# Patient Record
Sex: FEMALE | Race: WHITE | NOT HISPANIC OR LATINO | Employment: UNEMPLOYED | URBAN - METROPOLITAN AREA
[De-identification: names, ages, dates, MRNs, and addresses within clinical notes are randomized per-mention and may not be internally consistent; named-entity substitution may affect disease eponyms.]

---

## 2020-02-20 ENCOUNTER — HOSPITAL ENCOUNTER (OUTPATIENT)
Dept: OTHER | Facility: HOSPITAL | Age: 20
Discharge: HOME | End: 2020-02-20
Attending: EMERGENCY MEDICINE

## 2021-09-07 ENCOUNTER — CLINICAL SUPPORT (OUTPATIENT)
Dept: OBSTETRICS AND GYNECOLOGY | Facility: CLINIC | Age: 21
End: 2021-09-07

## 2021-09-07 DIAGNOSIS — Z30.013 ENCOUNTER FOR INITIAL PRESCRIPTION OF INJECTABLE CONTRACEPTIVE: Primary | ICD-10-CM

## 2021-09-07 PROCEDURE — 96372 THER/PROPH/DIAG INJ SC/IM: CPT | Performed by: OBSTETRICS & GYNECOLOGY

## 2021-09-07 RX ORDER — MEDROXYPROGESTERONE ACETATE 150 MG/ML
150 INJECTION, SUSPENSION INTRAMUSCULAR ONCE
Status: COMPLETED | OUTPATIENT
Start: 2021-09-07 | End: 2021-09-07

## 2021-09-07 RX ORDER — MEDROXYPROGESTERONE ACETATE 150 MG/ML
150 INJECTION, SUSPENSION INTRAMUSCULAR
Qty: 0.9 ML | Refills: 0 | OUTPATIENT
Start: 2021-09-07

## 2021-09-07 RX ORDER — MEDROXYPROGESTERONE ACETATE 150 MG/ML
1 INJECTION, SUSPENSION INTRAMUSCULAR
COMMUNITY
End: 2021-09-07

## 2021-09-07 RX ADMIN — MEDROXYPROGESTERONE ACETATE 150 MG: 150 INJECTION, SUSPENSION INTRAMUSCULAR at 15:57

## 2023-05-05 ENCOUNTER — TRANSCRIBE ORDERS (OUTPATIENT)
Dept: ADMINISTRATIVE | Facility: HOSPITAL | Age: 23
End: 2023-05-05
Payer: COMMERCIAL

## 2023-05-05 DIAGNOSIS — R10.2 PELVIC PAIN: Primary | ICD-10-CM

## 2025-01-24 ENCOUNTER — TELEPHONE (OUTPATIENT)
Age: 25
End: 2025-01-24

## 2025-01-24 NOTE — TELEPHONE ENCOUNTER
Caller: Gabi Thao    Relationship to patient: Self    Best call back number: [phone] (home)       Patient is needing: PATIENT HAS AN UPCOMING NEW OB APPOINTMENT WITH [facility]. I WAS UNABLE TO VERIFY HER INSURANCE. SHE STATES IT IS ACTIVE. THE ID # IS [id number]. SUBCRIBER IS BARBARA THAO  75. (PLAN IS THRU [employer])

## 2025-02-04 ENCOUNTER — INITIAL PRENATAL (OUTPATIENT)
Age: 25
End: 2025-02-04
Payer: COMMERCIAL

## 2025-02-04 VITALS — SYSTOLIC BLOOD PRESSURE: 112 MMHG | WEIGHT: 155 LBS | DIASTOLIC BLOOD PRESSURE: 76 MMHG

## 2025-02-04 DIAGNOSIS — O21.9 NAUSEA AND VOMITING DURING PREGNANCY: ICD-10-CM

## 2025-02-04 DIAGNOSIS — Z3A.10 10 WEEKS GESTATION OF PREGNANCY: Primary | ICD-10-CM

## 2025-02-04 DIAGNOSIS — Z34.90 PREGNANCY WITH FETUS OF UNKNOWN GESTATIONAL AGE: ICD-10-CM

## 2025-02-04 PROBLEM — F41.9 ANXIETY DISORDER: Status: ACTIVE | Noted: 2017-08-08

## 2025-02-04 PROBLEM — I73.81 ERYTHROMELALGIA: Status: ACTIVE | Noted: 2022-06-09

## 2025-02-04 PROBLEM — R20.9 SKIN SENSATION DISTURBANCE: Status: ACTIVE | Noted: 2022-06-09

## 2025-02-04 PROBLEM — F90.9 ADHD: Status: ACTIVE | Noted: 2025-02-04

## 2025-02-04 PROBLEM — R20.2 PARESTHESIA: Status: ACTIVE | Noted: 2023-02-24

## 2025-02-04 PROBLEM — F32.A DEPRESSION: Status: ACTIVE | Noted: 2021-06-21

## 2025-02-04 LAB
ABO GROUP BLD: NORMAL
AMPHET+METHAMPHET UR QL: NEGATIVE
AMPHETAMINES UR QL: NEGATIVE
BARBITURATES UR QL SCN: NEGATIVE
BENZODIAZ UR QL SCN: NEGATIVE
BLD GP AB SCN SERPL QL: NEGATIVE
BUPRENORPHINE SERPL-MCNC: NEGATIVE NG/ML
CANNABINOIDS SERPL QL: NEGATIVE
COCAINE UR QL: NEGATIVE
DEPRECATED RDW RBC AUTO: 39.9 FL (ref 37–54)
ERYTHROCYTE [DISTWIDTH] IN BLOOD BY AUTOMATED COUNT: 13 % (ref 12.3–15.4)
FENTANYL UR-MCNC: NEGATIVE NG/ML
HBV SURFACE AG SERPL QL IA: NORMAL
HCT VFR BLD AUTO: 36.4 % (ref 34–46.6)
HCV AB SER QL: NORMAL
HGB BLD-MCNC: 12.6 G/DL (ref 12–15.9)
HIV 1+2 AB+HIV1 P24 AG SERPL QL IA: NORMAL
MCH RBC QN AUTO: 29.5 PG (ref 26.6–33)
MCHC RBC AUTO-ENTMCNC: 34.6 G/DL (ref 31.5–35.7)
MCV RBC AUTO: 85.2 FL (ref 79–97)
METHADONE UR QL SCN: NEGATIVE
OPIATES UR QL: NEGATIVE
OXYCODONE UR QL SCN: NEGATIVE
PCP UR QL SCN: NEGATIVE
PLATELET # BLD AUTO: 290 10*3/MM3 (ref 140–450)
PMV BLD AUTO: 11.8 FL (ref 6–12)
RBC # BLD AUTO: 4.27 10*6/MM3 (ref 3.77–5.28)
RH BLD: POSITIVE
TRICYCLICS UR QL SCN: NEGATIVE
WBC NRBC COR # BLD AUTO: 10.67 10*3/MM3 (ref 3.4–10.8)

## 2025-02-04 PROCEDURE — 86850 RBC ANTIBODY SCREEN: CPT

## 2025-02-04 PROCEDURE — 87340 HEPATITIS B SURFACE AG IA: CPT

## 2025-02-04 PROCEDURE — 86803 HEPATITIS C AB TEST: CPT

## 2025-02-04 PROCEDURE — 86762 RUBELLA ANTIBODY: CPT

## 2025-02-04 PROCEDURE — 86901 BLOOD TYPING SEROLOGIC RH(D): CPT

## 2025-02-04 PROCEDURE — 87491 CHLMYD TRACH DNA AMP PROBE: CPT

## 2025-02-04 PROCEDURE — 86787 VARICELLA-ZOSTER ANTIBODY: CPT

## 2025-02-04 PROCEDURE — 86900 BLOOD TYPING SEROLOGIC ABO: CPT

## 2025-02-04 PROCEDURE — 87591 N.GONORRHOEAE DNA AMP PROB: CPT

## 2025-02-04 PROCEDURE — 87661 TRICHOMONAS VAGINALIS AMPLIF: CPT

## 2025-02-04 PROCEDURE — 85027 COMPLETE CBC AUTOMATED: CPT

## 2025-02-04 PROCEDURE — 86592 SYPHILIS TEST NON-TREP QUAL: CPT

## 2025-02-04 PROCEDURE — 80307 DRUG TEST PRSMV CHEM ANLYZR: CPT

## 2025-02-04 PROCEDURE — G0432 EIA HIV-1/HIV-2 SCREEN: HCPCS

## 2025-02-04 PROCEDURE — 87086 URINE CULTURE/COLONY COUNT: CPT

## 2025-02-04 RX ORDER — DIPHENHYDRAMINE HYDROCHLORIDE 25 MG/1
25 CAPSULE ORAL 3 TIMES DAILY PRN
Qty: 30 TABLET | Refills: 1 | Status: SHIPPED | OUTPATIENT
Start: 2025-02-04

## 2025-02-04 RX ORDER — PROMETHAZINE HYDROCHLORIDE 12.5 MG/1
12.5 TABLET ORAL EVERY 6 HOURS PRN
Qty: 30 TABLET | Refills: 0 | Status: SHIPPED | OUTPATIENT
Start: 2025-02-04

## 2025-02-04 RX ORDER — VITAMIN A, VITAMIN C, VITAMIN D-3, VITAMIN E, VITAMIN B-1, VITAMIN B-2, NIACIN, VITAMIN B-6, CALCIUM, IRON, ZINC, COPPER 4000; 120; 400; 22; 1.84; 3; 20; 10; 1; 12; 200; 27; 25; 2 [IU]/1; MG/1; [IU]/1; MG/1; MG/1; MG/1; MG/1; MG/1; MG/1; UG/1; MG/1; MG/1; MG/1; MG/1
1 TABLET ORAL DAILY
Qty: 30 TABLET | Refills: 11 | Status: SHIPPED | OUTPATIENT
Start: 2025-02-04

## 2025-02-04 RX ORDER — LANOLIN ALCOHOL/MO/W.PET/CERES
50 CREAM (GRAM) TOPICAL DAILY
COMMUNITY
End: 2025-02-04

## 2025-02-04 RX ORDER — PRENATAL VIT/IRON FUM/FOLIC AC 27MG-0.8MG
TABLET ORAL DAILY
COMMUNITY
End: 2025-02-04

## 2025-02-04 NOTE — PROGRESS NOTES
[facility]   Obstetrics and Gynecology   New Obstetric Visit    2025    Patient: Gabi Ralph          MR#:[id number]    History of Present Illness    Chief Complaint   Patient presents with    Initial Prenatal Visit     New OB, LMP 2024, U/S today, Pt C/O nausea and vomiting, currently taking vitamin B6        24 y.o. female  at 10w1d presents for NOB visit.  She is doing well today.  Taking prenatal vitamins.  Has nausea and vomiting  Had two prenatal care visits in [place] at [facility]  Had vb about a month ago was told in the ER she had KARL  Has some difficulty sleeping  Desires NIPT does not want to know gender  Taking vitamin B6  Antione CRESPO is with her  Moved here from [place] recently, living with parents    Studies reviewed:    ________________________________________  Patient Active Problem List   Diagnosis    ADHD    Anxiety disorder    Depression    Erythromelalgia    Paresthesia    Skin sensation disturbance     OB History          1    Para        Term                AB        Living             SAB        IAB        Ectopic        Molar        Multiple        Live Births                      Social History:  Denies h/o gonorrhea, chlamydia, herpes, syphilis, HIV  Denies family history of birth defects and genetic disorders    Past Medical History:   Diagnosis Date    Anxiety     Depression     Migraine aura, persistent      Past Surgical History:   Procedure Laterality Date    EAR TUBES      SKIN SURGERY       Social History     Tobacco Use   Smoking Status Former    Types: Cigarettes   Smokeless Tobacco Not on file     Family History   Problem Relation Age of Onset    Cancer Paternal Grandfather     Hyperlipidemia Other      Prior to Admission medications    Medication Sig Start Date End Date Taking? Authorizing Provider   Fexofenadine HCl (ALLERGY 24-HR PO) Take  by mouth.   Yes Provider, Zhou, MD   Prenatal Vit-Fe  Fumarate-FA (prenatal vitamin 27-0.8) 27-0.8 MG tablet tablet Take  by mouth Daily.  2/4/25 Yes Provider, MD Zhou   vitamin B-6 (PYRIDOXINE) 50 MG tablet Take 1 tablet by mouth Daily.  2/4/25 Yes Provider, MD Zhou   Prenatal Vit-Fe Fumarate-FA (Prenatal Vitamin Plus Low Iron) 27-1 MG tablet Take 1 tablet by mouth Daily. 2/4/25   Kallie Morgan APRN   promethazine (PHENERGAN) 12.5 MG tablet Take 1 tablet by mouth Every 6 (Six) Hours As Needed for Nausea or Vomiting. 2/4/25   Kallie Morgan APRN   vitamin B-6 (PYRIDOXINE) 25 MG tablet Take 1 tablet by mouth 3 (Three) Times a Day As Needed (for nausea). 2/4/25   Kallie Morgan APRN   medroxyPROGESTERone Acetate (Depo-Provera) 150 MG/ML suspension prefilled syringe Inject 1 mL into the appropriate muscle as directed by prescriber Every 3 (Three) Months.  Patient not taking: Reported on 2/4/2025 9/7/21 2/4/25  Ish Turpin Sr., MD     ________________________________________    The following portions of the patient's history were reviewed and updated as appropriate: allergies, current medications, past family history, past medical history, past social history, past surgical history, and problem list.    Review of Systems   Constitutional: Negative.    HENT: Negative.     Eyes: Negative.    Respiratory: Negative.     Cardiovascular: Negative.    Gastrointestinal:  Positive for nausea and vomiting.   Endocrine: Negative.    Genitourinary: Negative.    Musculoskeletal: Negative.    Skin: Negative.    Allergic/Immunologic: Negative.    Neurological: Negative.    Hematological: Negative.    Psychiatric/Behavioral: Negative.              Objective     /76   Wt 70.3 kg (155 lb)    BP Readings from Last 3 Encounters:   02/04/25 112/76      Wt Readings from Last 3 Encounters:   02/04/25 70.3 kg (155 lb)        BMI: There is no height or weight on file to calculate BMI.    Physical Exam  Constitutional:       General: She is not in  acute distress.  Pulmonary:      Effort: Pulmonary effort is normal.   Abdominal:      Palpations: Abdomen is soft.      Tenderness: There is no abdominal tenderness.   Skin:     General: Skin is warm.   Neurological:      Mental Status: She is alert.   Psychiatric:         Mood and Affect: Mood normal.         Thought Content: Thought content normal.         Judgment: Judgment normal.           Assessment:  Diagnoses and all orders for this visit:    1. 10 weeks gestation of pregnancy (Primary)  -     Urine Culture - Urine, Urine, Clean Catch  -     Urine Drug Screen - Urine, Clean Catch  -     promethazine (PHENERGAN) 12.5 MG tablet; Take 1 tablet by mouth Every 6 (Six) Hours As Needed for Nausea or Vomiting.  Dispense: 30 tablet; Refill: 0  -     Prenatal Vit-Fe Fumarate-FA (Prenatal Vitamin Plus Low Iron) 27-1 MG tablet; Take 1 tablet by mouth Daily.  Dispense: 30 tablet; Refill: 11  -     ABO / Rh  -     RPR Qualitative with Reflex to Quant  -     Hepatitis B Surface Antigen  -     Rubella Antibody, IgG  -     Hepatitis C Antibody  -     Antibody Screen  -     Varicella Zoster Antibody, IgG  -     CBC (No Diff)  -     HIV-1 / O / 2 Ag / Antibody  -     Eric Panorama Prenatal Test: Chromosomes 13, 18, 21, X & Y: Triploidy 22Q.11.2 Deletion - Blood, Blood, Venous  -     Eric Horizon 14 (Pan-Ethnic Standard) - Blood, Blood, Venous    2. Pregnancy with fetus of unknown gestational age  -     US Ob Transvaginal; Future  -     Eric Panorama Prenatal Test: Chromosomes 13, 18, 21, X & Y: Triploidy 22Q.11.2 Deletion - Blood, Blood, Venous  -     Eric Horizon 14 (Pan-Ethnic Standard) - Blood, Blood, Venous    3. Nausea and vomiting during pregnancy  -     promethazine (PHENERGAN) 12.5 MG tablet; Take 1 tablet by mouth Every 6 (Six) Hours As Needed for Nausea or Vomiting.  Dispense: 30 tablet; Refill: 0  -     Prenatal Vit-Fe Fumarate-FA (Prenatal Vitamin Plus Low Iron) 27-1 MG tablet; Take 1 tablet by mouth  Daily.  Dispense: 30 tablet; Refill: 11  -     vitamin B-6 (PYRIDOXINE) 25 MG tablet; Take 1 tablet by mouth 3 (Three) Times a Day As Needed (for nausea).  Dispense: 30 tablet; Refill: 1          Plan: Discussed Unisom and B6 she would like a prescription med sent discussed Phenergan can make you drowsy caution before driving  OB labs collected na sent  All questions answered and addressed  Declined pap today   Will request records release for OB in [place]  Early pregnancy counseling provided and New OB folder given  Patient is taking Prenatal vitamins  Problem list reviewed and updated  Reviewed routine prenatal care with the office to include but not limited to expected weight gain during pregnancy, Tylenol products are fine, avoid ibuprofen; not to change cat litter (toxoplasmosis) ; food restrictions including risk of listeria with uncooked deli meat, unwashed fruits and vegetables and unpasteurized cheeses; avoidance of alcohol, tobacco, drugs and saunas/hot tubs. Discussed that the COVID and Flu vaccine is safe and recommended in pregnancy.   SAB warnings reviewed  All questions answered  Discussed typical ultrasound screenings once in the first trimester for dating,  anatomy scan and additional if warranted      Return in about 4 weeks (around 3/4/2025).      Kallie Morgan, JOY  2/4/2025 16:08 EST

## 2025-02-05 LAB — RPR SER QL: NORMAL

## 2025-02-06 LAB
BACTERIA SPEC AEROBE CULT: NO GROWTH
C TRACH RRNA SPEC QL NAA+PROBE: NEGATIVE
N GONORRHOEA RRNA SPEC QL NAA+PROBE: NEGATIVE
RUBV IGG SERPL IA-ACNC: 2.59 INDEX
T VAGINALIS RRNA SPEC QL NAA+PROBE: NEGATIVE
VZV IGG SER QL IA: REACTIVE

## 2025-02-10 LAB
Lab: NORMAL
NTRA FETAL FRACTION: NORMAL
NTRA GENDER OF FETUS: NORMAL
NTRA MONOSOMY X AGE-BASED RISK TEXT: NORMAL
NTRA MONOSOMY X RESULT TEXT: NORMAL
NTRA MONOSOMY X RISK SCORE TEXT: NORMAL
NTRA TRIPLOIDY RESULT TEXT: NORMAL
NTRA TRISOMY 13 AGE-BASED RISK TEXT: NORMAL
NTRA TRISOMY 13 RESULT TEXT: NORMAL
NTRA TRISOMY 13 RISK SCORE TEXT: NORMAL
NTRA TRISOMY 18 AGE-BASED RISK TEXT: NORMAL
NTRA TRISOMY 18 RESULT TEXT: NORMAL
NTRA TRISOMY 18 RISK SCORE TEXT: NORMAL
NTRA TRISOMY 21 AGE-BASED RISK TEXT: NORMAL
NTRA TRISOMY 21 RESULT TEXT: NORMAL
NTRA TRISOMY 21 RISK SCORE TEXT: NORMAL

## 2025-02-12 LAB
Lab: NEGATIVE
Lab: NORMAL
NTRA ALPHA-THALASSEMIA: NEGATIVE
NTRA BETA-HEMOGLOBINOPATHIES: NEGATIVE
NTRA CANAVAN DISEASE: NEGATIVE
NTRA CYSTIC FIBROSIS: NEGATIVE
NTRA DUCHENNE/BECKER MUSCULAR DYSTROPHY: NEGATIVE
NTRA FAMILIAL DYSAUTONOMIA: NEGATIVE
NTRA FRAGILE X SYNDROME: NEGATIVE
NTRA GALACTOSEMIA: NEGATIVE
NTRA GAUCHER DISEASE: NEGATIVE
NTRA MEDIUM CHAIN ACYL-COA DEHYDROGENASE DEFICIENCY: NEGATIVE
NTRA POLYCYSTIC KIDNEY DISEASE, AUTOSOMAL RECESSIVE: NEGATIVE
NTRA SMITH-LEMLI-OPITZ SYNDROME: NEGATIVE
NTRA SPINAL MUSCULAR ATROPHY: NEGATIVE
NTRA TAY-SACHS DISEASE: NEGATIVE

## 2025-02-21 ENCOUNTER — PATIENT ROUNDING (BHMG ONLY) (OUTPATIENT)
Age: 25
End: 2025-02-21
Payer: COMMERCIAL

## 2025-03-03 ENCOUNTER — ROUTINE PRENATAL (OUTPATIENT)
Age: 25
End: 2025-03-03
Payer: COMMERCIAL

## 2025-03-03 VITALS — SYSTOLIC BLOOD PRESSURE: 113 MMHG | WEIGHT: 166 LBS | DIASTOLIC BLOOD PRESSURE: 64 MMHG

## 2025-03-03 DIAGNOSIS — Z3A.14 14 WEEKS GESTATION OF PREGNANCY: Primary | ICD-10-CM

## 2025-03-03 DIAGNOSIS — Z34.92 NORMAL PREGNANCY IN SECOND TRIMESTER: ICD-10-CM

## 2025-03-03 LAB
GLUCOSE UR STRIP-MCNC: NEGATIVE MG/DL
PROT UR STRIP-MCNC: ABNORMAL MG/DL

## 2025-03-03 PROCEDURE — 87624 HPV HI-RISK TYP POOLED RSLT: CPT | Performed by: OBSTETRICS & GYNECOLOGY

## 2025-03-03 PROCEDURE — G0123 SCREEN CERV/VAG THIN LAYER: HCPCS | Performed by: OBSTETRICS & GYNECOLOGY

## 2025-03-04 NOTE — PROGRESS NOTES
Routine Prenatal Visit     Subjective  Gabi Ralph is a 24 y.o.  at 14w1d here for her routine OB visit.   She is taking her prenatal vitamins.Reports no loss of fluid or vaginal bleeding. Patient doing well without any complaints. Pregnancy is complicated by:     Patient Active Problem List   Diagnosis    ADHD    Anxiety disorder    Depression    Erythromelalgia    Paresthesia    Skin sensation disturbance         OB History    Para Term  AB Living   1             SAB IAB Ectopic Molar Multiple Live Births                    # Outcome Date GA Lbr Edgardo/2nd Weight Sex Type Anes PTL Lv   1 Current                    ROS:   General ROS: negative for - chills or fatigue  Genito-Urinary ROS: negative for  change in urinary stream, vaginal discharge     Objective  Physical Exam:   Vitals:    25 1416   BP: 113/64       Uterine Size: size equals dates  FHT: 110-160 BPM    General appearance - alert, well appearing, and in no distress  Abdomen- Soft, Gravid uterus, non-tender to palpation  Extremeties: negative swelling   Pelvic exam:  No external genital lesion  No vaginal lesion  Cervix is visibly closed  Uterus about 14 weeks in size  No adnexal masses    Assessment/Plan:   Assessment & Plan  14 weeks gestation of pregnancy    Orders:    POC Urinalysis Dipstick    IgP, Aptima HPV; Future    IgP, Aptima HPV    Normal pregnancy in second trimester    Orders:    IgP, Aptima HPV      Counseling:     Round Ligament Pain:  The uterus has several ligaments which provide support and keep the uterus in place. As the  uterus grows these ligaments are pulled and stretched which often causes sharp stabbing like pain in the inguinal area.   You may find a pregnancy support band helpful. Changing positions may also help. Yoga is a great way to cope with round ligament and low back pain in pregnancy.    Massage may also help with low back pain   Things to Consider at this Point in your Pregnancy:  Some  women experience swelling in their feet during pregnancy. Compression stockings may help  Drink plenty of water and stay active   Make sure you are eating frequent small meals, nuts are a wonderful snack to keep with you            Return in about 4 weeks (around 3/31/2025) for Routine OB visit.      We have gone over prenatal care to include the timing and content of visits. I informed her how to contact the office and/or on call person in the event of any problems and encouraged her to do so when she feels it is necessary.  We then spent time answering her questions which she indicated were answered to her satisfaction.    Reji Leung MD  3/4/2025 08:39 EST

## 2025-03-10 LAB
CYTOLOGIST CVX/VAG CYTO: ABNORMAL
CYTOLOGY CVX/VAG DOC CYTO: ABNORMAL
CYTOLOGY CVX/VAG DOC THIN PREP: ABNORMAL
DX ICD CODE: ABNORMAL
DX ICD CODE: ABNORMAL
HPV I/H RISK 4 DNA CVX QL PROBE+SIG AMP: POSITIVE
OTHER STN SPEC: ABNORMAL
PATH REPORT.RELEVANT HX SPEC: ABNORMAL
PATHOLOGIST CVX/VAG CYTO: ABNORMAL
SERVICE CMNT-IMP: ABNORMAL
STAT OF ADQ CVX/VAG CYTO-IMP: ABNORMAL

## 2025-04-02 ENCOUNTER — ROUTINE PRENATAL (OUTPATIENT)
Age: 25
End: 2025-04-02
Payer: COMMERCIAL

## 2025-04-02 VITALS — SYSTOLIC BLOOD PRESSURE: 120 MMHG | WEIGHT: 169.8 LBS | DIASTOLIC BLOOD PRESSURE: 67 MMHG

## 2025-04-02 DIAGNOSIS — Z3A.18 18 WEEKS GESTATION OF PREGNANCY: Primary | ICD-10-CM

## 2025-04-02 DIAGNOSIS — O21.9 NAUSEA AND VOMITING DURING PREGNANCY: ICD-10-CM

## 2025-04-02 DIAGNOSIS — O26.812 PREGNANCY RELATED FATIGUE IN SECOND TRIMESTER: ICD-10-CM

## 2025-04-02 DIAGNOSIS — R87.612 LGSIL ON PAP SMEAR OF CERVIX: ICD-10-CM

## 2025-04-02 LAB
25(OH)D3 SERPL-MCNC: 36.2 NG/ML (ref 30–100)
DEPRECATED RDW RBC AUTO: 40.7 FL (ref 37–54)
ERYTHROCYTE [DISTWIDTH] IN BLOOD BY AUTOMATED COUNT: 13 % (ref 12.3–15.4)
FERRITIN SERPL-MCNC: 17.5 NG/ML (ref 13–150)
GLUCOSE UR STRIP-MCNC: NEGATIVE MG/DL
HCT VFR BLD AUTO: 35.4 % (ref 34–46.6)
HGB BLD-MCNC: 11.7 G/DL (ref 12–15.9)
MCH RBC QN AUTO: 28.5 PG (ref 26.6–33)
MCHC RBC AUTO-ENTMCNC: 33.1 G/DL (ref 31.5–35.7)
MCV RBC AUTO: 86.1 FL (ref 79–97)
PLATELET # BLD AUTO: 232 10*3/MM3 (ref 140–450)
PMV BLD AUTO: 11.7 FL (ref 6–12)
PROT UR STRIP-MCNC: NEGATIVE MG/DL
RBC # BLD AUTO: 4.11 10*6/MM3 (ref 3.77–5.28)
WBC NRBC COR # BLD AUTO: 8.92 10*3/MM3 (ref 3.4–10.8)

## 2025-04-02 PROCEDURE — 82728 ASSAY OF FERRITIN: CPT

## 2025-04-02 PROCEDURE — 85027 COMPLETE CBC AUTOMATED: CPT

## 2025-04-02 PROCEDURE — 82105 ALPHA-FETOPROTEIN SERUM: CPT

## 2025-04-02 PROCEDURE — 82306 VITAMIN D 25 HYDROXY: CPT

## 2025-04-02 RX ORDER — PROMETHAZINE HYDROCHLORIDE 12.5 MG/1
12.5 TABLET ORAL EVERY 6 HOURS PRN
Qty: 30 TABLET | Refills: 0 | Status: SHIPPED | OUTPATIENT
Start: 2025-04-02

## 2025-04-02 NOTE — PROGRESS NOTES
Chief Complaint   Patient presents with    Routine Prenatal Visit     Pt is here today for prenatal follow up. Pt is 18w 2d. Pt is having pain and pressure around the navel. Pt is very tired and would like to have iron and vitamin d checked. Pt would like to know what time to start dicussing birth plan. Pt needs refills, and has not been able to get phenergan. Pt is doing well otherwise.       HPI: 24 y.o.  at 18w2d   Feels exhausted   Sleeping ok   Admits to getting up at night to pee  No vb   Wants her labs checked reports hx of jona  Never picked up nausea meds  Wants to review birth plans    Relevant data reviewed:    Last OB US Data (since 2024)       None          Vitals:    25 1357 25 1441   BP: 132/67 120/67   Weight: 77 kg (169 lb 12.8 oz)      Total weight gain for pregnancy:  Not found.    Cx exam:   / /        Review of systems:   Gen: negative  CV:     negative  GI: negative  :   negative  MS:    negative  Neuro: negative and denies headaches and visual changes   Pul: negative    Physical Exam  Constitutional:       General: She is not in acute distress.  Pulmonary:      Effort: Pulmonary effort is normal.   Abdominal:      Palpations: Abdomen is soft.      Tenderness: There is no abdominal tenderness.   Skin:     General: Skin is warm.   Neurological:      Mental Status: She is alert.   Psychiatric:         Mood and Affect: Mood normal.         Thought Content: Thought content normal.         Judgment: Judgment normal.         A/P  1. Intrauterine pregnancy at 18w2d   2. Pregnancy Risk:  NORMAL       Pregnancy related fatigue in second trimester    Orders:    CBC (No Diff)    Ferritin    Vitamin D,25-Hydroxy    18 weeks gestation of pregnancy    Orders:    Alpha Fetoprotein, Maternal    Nausea and vomiting during pregnancy    Orders:    promethazine (PHENERGAN) 12.5 MG tablet; Take 1 tablet by mouth Every 6 (Six) Hours As Needed for Nausea or Vomiting.    LGSIL on Pap smear of  cervix           Nutrition and weight gain were addressed.  -----------------------  PLAN: sab precautions  Will check some labs  Discussed birth plan  She desires to have epidural and let placenta turn completely white  We discussed delayed cord clamping if stable  Discussed lsil pap with hpv will need colpo   Return in about 2 weeks (around 4/16/2025) for anatomy scan.    Kallie Morgan, APRN  4/2/2025 14:45 EDT

## 2025-04-09 LAB
AFP INTERP SERPL-IMP: NORMAL
AFP INTERP SERPL-IMP: NORMAL
AFP MOM SERPL: 1.12
AFP SERPL-MCNC: 46.7 NG/ML
AGE AT DELIVERY: 26 YR
GA METHOD: NORMAL
GA: 18.3 WEEKS
IDDM PATIENT QL: NORMAL
LABORATORY COMMENT REPORT: NORMAL
MULTIPLE PREGNANCY: NORMAL
NEURAL TUBE DEFECT RISK FETUS: 8371 %
RESULT: NORMAL

## 2025-04-17 ENCOUNTER — ROUTINE PRENATAL (OUTPATIENT)
Age: 25
End: 2025-04-17
Payer: COMMERCIAL

## 2025-04-17 VITALS — DIASTOLIC BLOOD PRESSURE: 64 MMHG | SYSTOLIC BLOOD PRESSURE: 118 MMHG | WEIGHT: 178.2 LBS

## 2025-04-17 DIAGNOSIS — Z3A.20 20 WEEKS GESTATION OF PREGNANCY: Primary | ICD-10-CM

## 2025-04-17 LAB
GLUCOSE UR STRIP-MCNC: NEGATIVE MG/DL
PROT UR STRIP-MCNC: NEGATIVE MG/DL

## 2025-04-17 NOTE — PROGRESS NOTES
Chief Complaint   Patient presents with    Routine Prenatal Visit     OB Check, Pt is 20w3d, Anatomy U/S today, Pt has no complaints today, Doing well        HPI: 24 y.o.  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    Cx exam:   / /        Review of systems:   Gen: negative  CV:     negative  GI: negative  :   good fetal movement noted   MS:    negative  Neuro: negative  Pul: negative    Physical Exam  Constitutional:       General: She is not in acute distress.  Pulmonary:      Effort: Pulmonary effort is normal.   Abdominal:      Palpations: Abdomen is soft.      Tenderness: There is no abdominal tenderness.   Skin:     General: Skin is warm.   Neurological:      Mental Status: She is alert.   Psychiatric:         Mood and Affect: Mood normal.         Thought Content: Thought content normal.         Judgment: Judgment normal.         A/P  1. Intrauterine pregnancy at 20w3d   2. Pregnancy Risk:  NORMAL        20 weeks gestation of pregnancy    Orders:    POC Urinalysis Dipstick      Nutrition and weight gain were addressed.  -----------------------  PLAN: anatomy complete today 91% EFW  Return in 4 weeks   PTB precautions  Return in about 4 weeks (around 5/15/2025).    Kallie Morgan, APRN  2025 14:13 EDT

## 2025-05-15 ENCOUNTER — ROUTINE PRENATAL (OUTPATIENT)
Age: 25
End: 2025-05-15
Payer: COMMERCIAL

## 2025-05-15 VITALS — DIASTOLIC BLOOD PRESSURE: 70 MMHG | SYSTOLIC BLOOD PRESSURE: 116 MMHG | WEIGHT: 187 LBS

## 2025-05-15 DIAGNOSIS — L30.9 ECZEMA, UNSPECIFIED TYPE: ICD-10-CM

## 2025-05-15 DIAGNOSIS — O99.712: ICD-10-CM

## 2025-05-15 DIAGNOSIS — Z3A.24 24 WEEKS GESTATION OF PREGNANCY: Primary | ICD-10-CM

## 2025-05-15 DIAGNOSIS — O21.9 NAUSEA AND VOMITING DURING PREGNANCY: ICD-10-CM

## 2025-05-15 LAB
GLUCOSE UR STRIP-MCNC: NEGATIVE MG/DL
PROT UR STRIP-MCNC: NEGATIVE MG/DL

## 2025-05-15 RX ORDER — DIPHENHYDRAMINE HYDROCHLORIDE 25 MG/1
25 CAPSULE ORAL 3 TIMES DAILY PRN
Qty: 30 TABLET | Refills: 1 | Status: SHIPPED | OUTPATIENT
Start: 2025-05-15

## 2025-05-15 NOTE — PROGRESS NOTES
Chief Complaint   Patient presents with    Routine Prenatal Visit     OB Check, Pt is 24w3d, Pt C/O rash on her left shoulder and her hands        HPI: 24 y.o.  at 24w3d doing well  Good fm  No lof, vb or contractions  Has a rash she thinks is her eczema on her hand and shoulder  Wants to change her pharmacy was not able to pickup B6  She is wondering if she can go to drag racing her father races      Relevant data reviewed:    Last OB US Data (since 10/27/2024)       None          Vitals:    05/15/25 1315   BP: 116/70   Weight: 84.8 kg (187 lb)     Total weight gain for pregnancy:  Not found.    Cx exam:   / /        Review of systems:   Gen: negative  CV:     negative  GI: negative  :   negative and good fetal movement noted   MS:    negative  Neuro: negative and denies headaches and visual changes   Pul: negative    Physical Exam  Constitutional:       General: She is not in acute distress.  Pulmonary:      Effort: Pulmonary effort is normal.   Abdominal:      Palpations: Abdomen is soft.      Tenderness: There is no abdominal tenderness.   Skin:     General: Skin is warm.             Comments: Small area noted to shoulder and right hand consistent with eczema rash dry scaly   Neurological:      Mental Status: She is alert.   Psychiatric:         Mood and Affect: Mood normal.         Thought Content: Thought content normal.         Judgment: Judgment normal.         A/P  1. Intrauterine pregnancy at 24w3d   2. Pregnancy Risk:  NORMAL        24 weeks gestation of pregnancy    Orders:    POC Urinalysis Dipstick    Nausea and vomiting during pregnancy    Orders:    vitamin B-6 (PYRIDOXINE) 25 MG tablet; Take 1 tablet by mouth 3 (Three) Times a Day As Needed (for nausea).    Skin and subcutaneous tissue disease complicating pregnancy in second trimester         Eczema, unspecified type            labor was discussed.  Warnings were provided.  Nutrition and weight gain were  addressed.  -----------------------  PLAN: Rash consistent with eczema  Can use hydrocortisone cream over-the-counter  Report any changes  Her partner let me know that she was flagging the cars I do not recommend this at as it is dangerous in pregnancy  PTB precautions  1 hour glucose next appointment    Return in about 4 weeks (around 6/12/2025) for 1 hour GTT.    Kallie Morgan, APRN  5/15/2025 13:30 EDT

## 2025-06-09 ENCOUNTER — ROUTINE PRENATAL (OUTPATIENT)
Age: 25
End: 2025-06-09
Payer: COMMERCIAL

## 2025-06-09 VITALS
DIASTOLIC BLOOD PRESSURE: 84 MMHG | BODY MASS INDEX: 31.99 KG/M2 | SYSTOLIC BLOOD PRESSURE: 131 MMHG | WEIGHT: 192 LBS | HEIGHT: 65 IN

## 2025-06-09 DIAGNOSIS — Z34.93 NORMAL PREGNANCY, THIRD TRIMESTER: Primary | ICD-10-CM

## 2025-06-09 DIAGNOSIS — Z3A.28 28 WEEKS GESTATION OF PREGNANCY: ICD-10-CM

## 2025-06-09 DIAGNOSIS — Z13.89 SCREENING FOR HEMATURIA OR PROTEINURIA: ICD-10-CM

## 2025-06-09 LAB
DEPRECATED RDW RBC AUTO: 44.4 FL (ref 37–54)
ERYTHROCYTE [DISTWIDTH] IN BLOOD BY AUTOMATED COUNT: 13.4 % (ref 12.3–15.4)
GLUCOSE 1H P GLC SERPL-MCNC: 142 MG/DL (ref 65–139)
GLUCOSE UR STRIP-MCNC: NEGATIVE MG/DL
HCT VFR BLD AUTO: 31.6 % (ref 34–46.6)
HGB BLD-MCNC: 10.1 G/DL (ref 12–15.9)
MCH RBC QN AUTO: 29 PG (ref 26.6–33)
MCHC RBC AUTO-ENTMCNC: 32 G/DL (ref 31.5–35.7)
MCV RBC AUTO: 90.8 FL (ref 79–97)
PLATELET # BLD AUTO: 214 10*3/MM3 (ref 140–450)
PMV BLD AUTO: 12.2 FL (ref 6–12)
PROT UR STRIP-MCNC: NEGATIVE MG/DL
RBC # BLD AUTO: 3.48 10*6/MM3 (ref 3.77–5.28)
WBC NRBC COR # BLD AUTO: 9.59 10*3/MM3 (ref 3.4–10.8)

## 2025-06-09 PROCEDURE — 86592 SYPHILIS TEST NON-TREP QUAL: CPT | Performed by: OBSTETRICS & GYNECOLOGY

## 2025-06-09 PROCEDURE — 0502F SUBSEQUENT PRENATAL CARE: CPT | Performed by: OBSTETRICS & GYNECOLOGY

## 2025-06-09 PROCEDURE — 82950 GLUCOSE TEST: CPT | Performed by: OBSTETRICS & GYNECOLOGY

## 2025-06-09 PROCEDURE — 85027 COMPLETE CBC AUTOMATED: CPT | Performed by: OBSTETRICS & GYNECOLOGY

## 2025-06-09 NOTE — PROGRESS NOTES
Routine Prenatal Visit     Subjective  Gabi Ralph is a 24 y.o.  at 28w0d here for her routine OB visit.   She is taking her prenatal vitamins.Reports no loss of fluid or vaginal bleeding. Patient doing well without any complaints. Pregnancy complicated by:     Patient Active Problem List   Diagnosis    ADHD    Anxiety disorder    Depression    Erythromelalgia    Paresthesia    Skin sensation disturbance         OB History    Para Term  AB Living   1        SAB IAB Ectopic Molar Multiple Live Births              # Outcome Date GA Lbr Edgardo/2nd Weight Sex Type Anes PTL Lv   1 Current                    ROS:   General ROS: negative for - chills or fatigue  Genito-Urinary ROS: negative for  change in urinary stream, vaginal discharge     Objective  Physical Exam:   Vitals:    25 1253   BP: 131/84       Uterine Size: size equals dates  FHT: 110-160 BPM    General appearance - alert, well appearing, and in no distress  Abdomen- Soft, Gravid uterus, non-tender to palpation  Extremeties: negative swelling       Assessment/Plan:   Assessment & Plan  Normal pregnancy, third trimester    Orders:    CBC (No Diff)    Gestational Diabetes Screen 1 Hour    RPR; Future    28 weeks gestation of pregnancy           Counseling:     Round Ligament Pain:  The uterus has several ligaments which provide support and keep the uterus in place. As the  uterus grows these ligaments are pulled and stretched which often causes sharp stabbing like pain in the inguinal area.   You may find a pregnancy support band helpful. Changing positions may also help. Yoga is a great way to cope with round ligament and low back pain in pregnancy.    Massage may also help with low back pain   Things to Consider at this Point in your Pregnancy:  Some women experience swelling in their feet during pregnancy. Compression stockings may help  Drink plenty of water and stay active   Make sure you are eating frequent small meals, nuts  are a wonderful snack to keep with you            No follow-ups on file.      We have gone over prenatal care to include the timing and content of visits. I informed her how to contact the office and/or on call person in the event of any problems and encouraged her to do so when she feels it is necessary.  We then spent time answering her questions which she indicated were answered to her satisfaction.    Reji Leung MD  6/9/2025 13:04 EDT

## 2025-06-10 LAB — RPR SER QL: NORMAL

## 2025-06-12 ENCOUNTER — LAB (OUTPATIENT)
Facility: HOSPITAL | Age: 25
End: 2025-06-12
Payer: COMMERCIAL

## 2025-06-12 DIAGNOSIS — O99.810 ABNORMAL GLUCOSE TOLERANCE TEST (GTT) DURING PREGNANCY, ANTEPARTUM: Primary | ICD-10-CM

## 2025-06-12 DIAGNOSIS — O99.810 ABNORMAL GLUCOSE TOLERANCE TEST (GTT) DURING PREGNANCY, ANTEPARTUM: ICD-10-CM

## 2025-06-12 LAB
GLUCOSE 1H P 100 G GLC PO SERPL-MCNC: 142 MG/DL (ref 74–180)
GLUCOSE 2H P 100 G GLC PO SERPL-MCNC: 98 MG/DL (ref 74–155)
GLUCOSE 3H P 100 G GLC PO SERPL-MCNC: 62 MG/DL (ref 74–140)
GLUCOSE BLDC GLUCOMTR-MCNC: 77 MG/DL (ref 70–99)
GLUCOSE P FAST SERPL-MCNC: 85 MG/DL (ref 74–106)

## 2025-06-12 PROCEDURE — 82951 GLUCOSE TOLERANCE TEST (GTT): CPT

## 2025-06-12 PROCEDURE — 36415 COLL VENOUS BLD VENIPUNCTURE: CPT

## 2025-06-12 PROCEDURE — 82952 GTT-ADDED SAMPLES: CPT

## 2025-06-17 ENCOUNTER — TELEPHONE (OUTPATIENT)
Age: 25
End: 2025-06-17
Payer: COMMERCIAL

## 2025-06-17 NOTE — TELEPHONE ENCOUNTER
Spoke to patient to let her know that we received the orders from [facility] for the Breast pump and Sacroiliac Support. They have been signed and faxed back to [facility]. Patient voiced understanding.

## 2025-06-26 ENCOUNTER — ROUTINE PRENATAL (OUTPATIENT)
Age: 25
End: 2025-06-26
Payer: COMMERCIAL

## 2025-06-26 VITALS — SYSTOLIC BLOOD PRESSURE: 116 MMHG | DIASTOLIC BLOOD PRESSURE: 78 MMHG | WEIGHT: 197.6 LBS | BODY MASS INDEX: 32.88 KG/M2

## 2025-06-26 DIAGNOSIS — Z3A.30 30 WEEKS GESTATION OF PREGNANCY: Primary | ICD-10-CM

## 2025-06-26 DIAGNOSIS — D50.9 IRON DEFICIENCY ANEMIA DURING PREGNANCY: ICD-10-CM

## 2025-06-26 DIAGNOSIS — O99.019 IRON DEFICIENCY ANEMIA DURING PREGNANCY: ICD-10-CM

## 2025-06-26 DIAGNOSIS — R11.2 NAUSEA AND VOMITING, UNSPECIFIED VOMITING TYPE: ICD-10-CM

## 2025-06-26 LAB
GLUCOSE UR STRIP-MCNC: NEGATIVE MG/DL
PROT UR STRIP-MCNC: NEGATIVE MG/DL

## 2025-06-26 RX ORDER — ONDANSETRON 4 MG/1
4 TABLET, ORALLY DISINTEGRATING ORAL EVERY 8 HOURS PRN
Qty: 15 TABLET | Refills: 0 | Status: SHIPPED | OUTPATIENT
Start: 2025-06-26

## 2025-06-26 RX ORDER — FERROUS SULFATE 325(65) MG
325 TABLET ORAL
Qty: 30 TABLET | Refills: 3 | Status: SHIPPED | OUTPATIENT
Start: 2025-06-26

## 2025-06-26 NOTE — PROGRESS NOTES
Chief Complaint   Patient presents with    Routine Prenatal Visit     OB Check, Pt is 30w3d, Pt has no complaints today, Doing well        HPI: 24 y.o.  at 30w3d doing ok  She and the possible fob have broken up  She does not want him to be involved going further  She is safe and living with her parents now  Good FM  No lof, vb or contractions  Has experienced some n/v again    Relevant data reviewed:CBC (No Diff) (2025 13:50)     Last OB US Data (since 2024)       None          Vitals:    25 1315   BP: 116/78   Weight: 89.6 kg (197 lb 9.6 oz)     Total weight gain for pregnancy:  28.4 kg (62 lb 9.6 oz)    Cx exam:   / /   Presentation: Variable    Review of systems:   Gen: negative  CV:     negative  GI: negative  :   negative and good fetal movement noted   MS:    negative  Neuro: negative and denies headaches and visual changes   Pul: negative    Physical Exam  Constitutional:       General: She is not in acute distress.  Pulmonary:      Effort: Pulmonary effort is normal.   Abdominal:      Palpations: Abdomen is soft.      Tenderness: There is no abdominal tenderness.   Skin:     General: Skin is warm.   Neurological:      Mental Status: She is alert.   Psychiatric:         Mood and Affect: Mood normal.         Thought Content: Thought content normal.         Judgment: Judgment normal.         A/P  1. Intrauterine pregnancy at 30w3d   2. Pregnancy Risk:  NORMAL        30 weeks gestation of pregnancy    Orders:    POC Urinalysis Dipstick    Nausea and vomiting, unspecified vomiting type    Orders:    ondansetron ODT (ZOFRAN-ODT) 4 MG disintegrating tablet; Place 1 tablet on the tongue Every 8 (Eight) Hours As Needed for Nausea or Vomiting.    Iron deficiency anemia during pregnancy    Orders:    ferrous sulfate 325 (65 FE) MG tablet; Take 1 tablet by mouth Daily With Breakfast.       labor was discussed.  Warnings were provided.  Nutrition and weight gain were  addressed.  -----------------------  PLAN: start iron pill daily with breakfast  Ptb precautions discussed   Nausea meds sent to another pharmacy  Plan tdap next visit       Return in about 2 weeks (around 7/10/2025).    Kallie Morgan, APRN  6/26/2025 14:54 EDT

## 2025-07-11 ENCOUNTER — ROUTINE PRENATAL (OUTPATIENT)
Age: 25
End: 2025-07-11
Payer: COMMERCIAL

## 2025-07-11 VITALS — BODY MASS INDEX: 33.2 KG/M2 | DIASTOLIC BLOOD PRESSURE: 74 MMHG | SYSTOLIC BLOOD PRESSURE: 114 MMHG | WEIGHT: 199.5 LBS

## 2025-07-11 DIAGNOSIS — Z34.93 NORMAL PREGNANCY, THIRD TRIMESTER: ICD-10-CM

## 2025-07-11 DIAGNOSIS — Z23 NEED FOR TDAP VACCINATION: ICD-10-CM

## 2025-07-11 DIAGNOSIS — Z13.89 SCREENING FOR HEMATURIA OR PROTEINURIA: Primary | ICD-10-CM

## 2025-07-11 DIAGNOSIS — Z3A.32 32 WEEKS GESTATION OF PREGNANCY: ICD-10-CM

## 2025-07-11 LAB
GLUCOSE UR STRIP-MCNC: NEGATIVE MG/DL
PROT UR STRIP-MCNC: NEGATIVE MG/DL

## 2025-07-11 NOTE — PROGRESS NOTES
Routine Prenatal Visit     Subjective  Gabi Ralph is a 24 y.o.  at 32w4d here for her routine OB visit.   She is taking her prenatal vitamins.Reports no loss of fluid or vaginal bleeding. Patient doing well without any complaints except for pregnancy discomfort. Pregnancy complicated by:     Patient Active Problem List   Diagnosis    ADHD    Anxiety disorder    Depression    Erythromelalgia    Paresthesia    Skin sensation disturbance         OB History    Para Term  AB Living   1        SAB IAB Ectopic Molar Multiple Live Births              # Outcome Date GA Lbr Edgardo/2nd Weight Sex Type Anes PTL Lv   1 Current                    ROS:   General ROS: negative for - chills or fatigue  Genito-Urinary ROS: negative for  change in urinary stream, vaginal discharge     Objective  Physical Exam:   Vitals:    25 1353   BP: 114/74       Uterine Size: size greater than dates  FHT: 110-160 BPM    General appearance - alert, well appearing, and in no distress  Abdomen- Soft, Gravid uterus, non-tender to palpation  Extremeties: negative swelling       Assessment/Plan:   Assessment & Plan  Screening for hematuria or proteinuria    Orders:    POC Urinalysis Dipstick    Need for Tdap vaccination    Orders:    Tdap Vaccine => 6yo IM (BOOSTRIX/ADACEL)    32 weeks gestation of pregnancy         Normal pregnancy, third trimester         Macrosomia  Suspected macrosomia.  Will repeat growth scan at next visit.         Counseling:   OB precautions, leaking, VB, fide warner vs PTL/Labor  FKC  HTN precautions reviewed: HA, vision change, RUQ/epigastric pain, edema  Round Ligament Pain:  The uterus has several ligaments which provide support and keep the uterus in place. As the  uterus grows these ligaments are pulled and stretched which often causes sharp stabbing like pain in the inguinal area.   You may find a pregnancy support band helpful. Changing positions may also help. Yoga is a great way to  cope with round ligament and low back pain in pregnancy.    Massage may also help with low back pain   Things to Consider at this Point in your Pregnancy:  Some women experience swelling in their feet during pregnancy. Compression stockings may help  Drink plenty of water and stay active   Make sure you are eating frequent small meals, nuts are a wonderful snack to keep with you            No follow-ups on file.      We have gone over prenatal care to include the timing and content of visits. I informed her how to contact the office and/or on call person in the event of any problems and encouraged her to do so when she feels it is necessary.  We then spent time answering her questions which she indicated were answered to her satisfaction.    Reji Leung MD  7/11/2025 14:06 EDT

## 2025-07-24 ENCOUNTER — ROUTINE PRENATAL (OUTPATIENT)
Age: 25
End: 2025-07-24
Payer: COMMERCIAL

## 2025-07-24 VITALS — WEIGHT: 206.1 LBS | SYSTOLIC BLOOD PRESSURE: 118 MMHG | DIASTOLIC BLOOD PRESSURE: 74 MMHG | BODY MASS INDEX: 34.3 KG/M2

## 2025-07-24 DIAGNOSIS — Z36.85 ANTENATAL SCREENING FOR STREPTOCOCCUS B: ICD-10-CM

## 2025-07-24 DIAGNOSIS — O26.893 PELVIC PRESSURE IN PREGNANCY, THIRD TRIMESTER: ICD-10-CM

## 2025-07-24 DIAGNOSIS — R10.2 PELVIC PRESSURE IN PREGNANCY, THIRD TRIMESTER: ICD-10-CM

## 2025-07-24 DIAGNOSIS — Z3A.34 34 WEEKS GESTATION OF PREGNANCY: Primary | ICD-10-CM

## 2025-07-24 DIAGNOSIS — O26.849 UTERINE SIZE DATE DISCREPANCY PREGNANCY: ICD-10-CM

## 2025-07-24 DIAGNOSIS — O36.63X0 EXCESSIVE FETAL GROWTH AFFECTING MANAGEMENT OF PREGNANCY IN THIRD TRIMESTER, SINGLE OR UNSPECIFIED FETUS: ICD-10-CM

## 2025-07-24 LAB
GLUCOSE UR STRIP-MCNC: NEGATIVE MG/DL
PROT UR STRIP-MCNC: NEGATIVE MG/DL

## 2025-07-24 PROCEDURE — 87081 CULTURE SCREEN ONLY: CPT

## 2025-07-24 NOTE — PROGRESS NOTES
Chief Complaint   Patient presents with    Routine Prenatal Visit     OB Check, Pt is 34w3d, U/S today, Pt reports stomach hardening and pelvic pressure, Pt wanting to discuss mucus plug        HPI: Gabi Ralph, a 24 y.o.  at 34w3d, presents for OB follow-up.  She is doing well today.  Denies loss of fluid, vaginal bleeding, and contractions.  Active fetal movement   Felt like she may have lost some of her mucus plug   Had a thick snot like discharge that was one time nothing further  Has fide warner contractions and pelvic pressure  [place]    Relevant data reviewed:    Objective  BP Readings from Last 3 Encounters:   25 118/74   25 114/74   25 116/78      Wt Readings from Last 3 Encounters:   25 93.5 kg (206 lb 1.6 oz)   25 90.5 kg (199 lb 8 oz)   25 89.6 kg (197 lb 9.6 oz)      Total weight gain this pregnancy: 32.3 kg (71 lb 1.6 oz)    General: Awake, alert, no apparent distress  Respiratory: No increased work of breathing  Abdomen: Fundus soft and nontender  Extremity: Nontender, no edema      Last OB US Data (since 2025)         Value Time User    Placenta location  Posterior 2025  1:32 PM Kallie Morgan APRN    EFW%  96%ile 2025  1:32 PM Kallie Morgan APRN    AC%  99%ile 2025  1:32 PM Kallie Morgan APRN    BPP  8/8 2025  1:32 PM Kallie Morgan APRN    WIL  18.57 cm 2025  1:32 PM Kallie Morgan APRN          Vitals:    25 1300   BP: 118/74   Weight: 93.5 kg (206 lb 1.6 oz)     Total weight gain for pregnancy:  32.3 kg (71 lb 1.6 oz)    Review of systems:   Gen: negative  CV:     negative  GI: negative  :   good fetal movement noted , fide warner type contractions, and pelvic pressure  MS:    negative  Neuro: negative and denies headaches and visual changes   Pul: negative    Physical Exam  Constitutional:       General: She is not in acute distress.  Pulmonary:       Effort: Pulmonary effort is normal.   Abdominal:      Palpations: Abdomen is soft.      Tenderness: There is no abdominal tenderness.   Skin:     General: Skin is warm.   Neurological:      Mental Status: She is alert.   Psychiatric:         Mood and Affect: Mood normal.         Thought Content: Thought content normal.         Judgment: Judgment normal.         A/P  1. Intrauterine pregnancy at 34w3d       Diagnoses and all orders for this visit:    1. 34 weeks gestation of pregnancy (Primary)    2. Uterine size date discrepancy pregnancy    3. Pelvic pressure in pregnancy, third trimester    4.  screening for streptococcus B  -     Group B Streptococcus Culture - Swab, Vaginal/Rectum    5. Excessive fetal growth affecting management of pregnancy in third trimester, single or unspecified fetus        Pre-eclampsia symptoms were discussed and warnings were given.   labor was discussed.  Warnings were provided.  Nutrition and weight gain were addressed.  -----------------------  PLAN: discussed LGA today  We reviewed potential risk with large babies and delivery modes  Discussed possibility of  pending size of baby   Will discuss with Dr. Leung  Discussed I do not recommend her traveling far distances from a hospital that would be able to provider her care in the event she needs it     Return in about 1 week (around 2025).    Kallie Morgan, APRN  2025 13:40 EDT

## 2025-07-26 LAB — BACTERIA SPEC AEROBE CULT: NORMAL

## 2025-07-28 ENCOUNTER — TELEPHONE (OUTPATIENT)
Age: 25
End: 2025-07-28

## 2025-08-05 ENCOUNTER — HOSPITAL ENCOUNTER (OUTPATIENT)
Facility: HOSPITAL | Age: 25
Discharge: HOME | End: 2025-08-05
Attending: OBSTETRICS & GYNECOLOGY | Admitting: OBSTETRICS & GYNECOLOGY
Payer: COMMERCIAL

## 2025-08-05 VITALS
DIASTOLIC BLOOD PRESSURE: 81 MMHG | SYSTOLIC BLOOD PRESSURE: 125 MMHG | WEIGHT: 206 LBS | HEART RATE: 97 BPM | BODY MASS INDEX: 34.32 KG/M2 | TEMPERATURE: 98.1 F | RESPIRATION RATE: 22 BRPM | HEIGHT: 65 IN | OXYGEN SATURATION: 98 %

## 2025-08-05 LAB
BILIRUB BLD-MCNC: NEGATIVE MG/DL
CLARITY, POC: CLEAR
COLOR UR: YELLOW
GLUCOSE UR STRIP-MCNC: ABNORMAL MG/DL
KETONES UR QL: NEGATIVE
LEUKOCYTE EST, POC: ABNORMAL
NITRITE UR-MCNC: NEGATIVE MG/ML
PH UR: 7 [PH] (ref 5–8)
PROT UR STRIP-MCNC: NEGATIVE MG/DL
RBC # UR STRIP: NEGATIVE /UL
SP GR UR: 1.01 (ref 1–1.03)
UROBILINOGEN UR QL: ABNORMAL

## 2025-08-05 PROCEDURE — G0463 HOSPITAL OUTPT CLINIC VISIT: HCPCS

## 2025-08-05 PROCEDURE — 81002 URINALYSIS NONAUTO W/O SCOPE: CPT | Performed by: OBSTETRICS & GYNECOLOGY

## 2025-08-05 PROCEDURE — 59025 FETAL NON-STRESS TEST: CPT

## 2025-08-05 RX ORDER — DOCUSATE SODIUM 100 MG/1
100 CAPSULE, LIQUID FILLED ORAL 2 TIMES DAILY
COMMUNITY

## 2025-08-06 ENCOUNTER — ROUTINE PRENATAL (OUTPATIENT)
Age: 25
End: 2025-08-06
Payer: COMMERCIAL

## 2025-08-06 VITALS — DIASTOLIC BLOOD PRESSURE: 88 MMHG | WEIGHT: 207 LBS | SYSTOLIC BLOOD PRESSURE: 127 MMHG | BODY MASS INDEX: 34.45 KG/M2

## 2025-08-06 DIAGNOSIS — Z3A.36 36 WEEKS GESTATION OF PREGNANCY: Primary | ICD-10-CM

## 2025-08-06 DIAGNOSIS — Z34.93 NORMAL PREGNANCY, THIRD TRIMESTER: ICD-10-CM

## 2025-08-06 LAB
GLUCOSE UR STRIP-MCNC: NEGATIVE MG/DL
PROT UR STRIP-MCNC: ABNORMAL MG/DL

## 2025-08-07 DIAGNOSIS — O21.9 NAUSEA AND VOMITING DURING PREGNANCY: ICD-10-CM

## 2025-08-07 DIAGNOSIS — R11.2 NAUSEA AND VOMITING, UNSPECIFIED VOMITING TYPE: ICD-10-CM

## 2025-08-07 RX ORDER — PROMETHAZINE HYDROCHLORIDE 12.5 MG/1
12.5 TABLET ORAL EVERY 6 HOURS PRN
Qty: 30 TABLET | Refills: 0 | Status: CANCELLED | OUTPATIENT
Start: 2025-08-07

## 2025-08-08 ENCOUNTER — ANESTHESIA EVENT (OUTPATIENT)
Dept: LABOR AND DELIVERY | Facility: HOSPITAL | Age: 25
End: 2025-08-08
Payer: COMMERCIAL

## 2025-08-08 ENCOUNTER — TELEPHONE (OUTPATIENT)
Age: 25
End: 2025-08-08
Payer: COMMERCIAL

## 2025-08-08 ENCOUNTER — HOSPITAL ENCOUNTER (INPATIENT)
Facility: HOSPITAL | Age: 25
LOS: 3 days | Discharge: HOME | End: 2025-08-11
Attending: OBSTETRICS & GYNECOLOGY | Admitting: OBSTETRICS & GYNECOLOGY
Payer: COMMERCIAL

## 2025-08-08 ENCOUNTER — ANESTHESIA (OUTPATIENT)
Dept: LABOR AND DELIVERY | Facility: HOSPITAL | Age: 25
End: 2025-08-08
Payer: COMMERCIAL

## 2025-08-08 PROBLEM — Z34.90 ENCOUNTER FOR INDUCTION OF LABOR: Status: ACTIVE | Noted: 2025-08-08

## 2025-08-08 LAB
ABO GROUP BLD: NORMAL
ALBUMIN SERPL-MCNC: 3.5 G/DL (ref 3.5–5.2)
ALBUMIN/GLOB SERPL: 1.3 G/DL
ALP SERPL-CCNC: 103 U/L (ref 39–117)
ALT SERPL W P-5'-P-CCNC: 11 U/L (ref 1–33)
AMPHET+METHAMPHET UR QL: NEGATIVE
AMPHETAMINES UR QL: NEGATIVE
ANION GAP SERPL CALCULATED.3IONS-SCNC: 13.3 MMOL/L (ref 5–15)
AST SERPL-CCNC: 16 U/L (ref 1–32)
BARBITURATES UR QL SCN: NEGATIVE
BENZODIAZ UR QL SCN: NEGATIVE
BILIRUB SERPL-MCNC: 0.3 MG/DL (ref 0–1.2)
BLD GP AB SCN SERPL QL: NEGATIVE
BUN SERPL-MCNC: 4.6 MG/DL (ref 6–20)
BUN/CREAT SERPL: 7.1 (ref 7–25)
BUPRENORPHINE SERPL-MCNC: NEGATIVE NG/ML
CALCIUM SPEC-SCNC: 11.2 MG/DL (ref 8.6–10.5)
CANNABINOIDS SERPL QL: NEGATIVE
CHLORIDE SERPL-SCNC: 105 MMOL/L (ref 98–107)
CO2 SERPL-SCNC: 17.7 MMOL/L (ref 22–29)
COCAINE UR QL: NEGATIVE
CREAT SERPL-MCNC: 0.65 MG/DL (ref 0.57–1)
CREAT UR-MCNC: 34.3 MG/DL
DEPRECATED RDW RBC AUTO: 40.6 FL (ref 37–54)
EGFRCR SERPLBLD CKD-EPI 2021: 125.5 ML/MIN/1.73
ERYTHROCYTE [DISTWIDTH] IN BLOOD BY AUTOMATED COUNT: 13.3 % (ref 12.3–15.4)
FENTANYL UR-MCNC: NEGATIVE NG/ML
GLOBULIN UR ELPH-MCNC: 2.7 GM/DL
GLUCOSE SERPL-MCNC: 97 MG/DL (ref 65–99)
HCT VFR BLD AUTO: 29.5 % (ref 34–46.6)
HGB BLD-MCNC: 10.1 G/DL (ref 12–15.9)
MCH RBC QN AUTO: 28.9 PG (ref 26.6–33)
MCHC RBC AUTO-ENTMCNC: 34.2 G/DL (ref 31.5–35.7)
MCV RBC AUTO: 84.5 FL (ref 79–97)
METHADONE UR QL SCN: NEGATIVE
OPIATES UR QL: NEGATIVE
OXYCODONE UR QL SCN: NEGATIVE
PCP UR QL SCN: NEGATIVE
PLATELET # BLD AUTO: 201 10*3/MM3 (ref 140–450)
PMV BLD AUTO: 11.5 FL (ref 6–12)
POTASSIUM SERPL-SCNC: 3.4 MMOL/L (ref 3.5–5.2)
PROT ?TM UR-MCNC: 14.1 MG/DL
PROT SERPL-MCNC: 6.2 G/DL (ref 6–8.5)
PROT/CREAT UR: 0.41 MG/G{CREAT}
RBC # BLD AUTO: 3.49 10*6/MM3 (ref 3.77–5.28)
RH BLD: POSITIVE
SODIUM SERPL-SCNC: 136 MMOL/L (ref 136–145)
T&S EXPIRATION DATE: NORMAL
TREPONEMA PALLIDUM IGG+IGM AB [PRESENCE] IN SERUM OR PLASMA BY IMMUNOASSAY: NORMAL
TRICYCLICS UR QL SCN: NEGATIVE
WBC NRBC COR # BLD AUTO: 10.67 10*3/MM3 (ref 3.4–10.8)

## 2025-08-08 PROCEDURE — 86900 BLOOD TYPING SEROLOGIC ABO: CPT | Performed by: OBSTETRICS & GYNECOLOGY

## 2025-08-08 PROCEDURE — 25010000002 ONDANSETRON PER 1 MG: Performed by: OBSTETRICS & GYNECOLOGY

## 2025-08-08 PROCEDURE — C1755 CATHETER, INTRASPINAL: HCPCS | Performed by: NURSE ANESTHETIST, CERTIFIED REGISTERED

## 2025-08-08 PROCEDURE — 25010000002 BETAMETHASONE ACET & SOD PHOS PER 4 MG: Performed by: OBSTETRICS & GYNECOLOGY

## 2025-08-08 PROCEDURE — 82570 ASSAY OF URINE CREATININE: CPT | Performed by: OBSTETRICS & GYNECOLOGY

## 2025-08-08 PROCEDURE — 25010000002 OXYTOCIN PER 10 UNITS: Performed by: OBSTETRICS & GYNECOLOGY

## 2025-08-08 PROCEDURE — 86850 RBC ANTIBODY SCREEN: CPT | Performed by: OBSTETRICS & GYNECOLOGY

## 2025-08-08 PROCEDURE — 84156 ASSAY OF PROTEIN URINE: CPT | Performed by: OBSTETRICS & GYNECOLOGY

## 2025-08-08 PROCEDURE — 86780 TREPONEMA PALLIDUM: CPT | Performed by: OBSTETRICS & GYNECOLOGY

## 2025-08-08 PROCEDURE — 25810000003 SODIUM CHLORIDE 0.9 % SOLUTION: Performed by: OBSTETRICS & GYNECOLOGY

## 2025-08-08 PROCEDURE — 25810000003 LACTATED RINGERS PER 1000 ML: Performed by: OBSTETRICS & GYNECOLOGY

## 2025-08-08 PROCEDURE — 80307 DRUG TEST PRSMV CHEM ANLYZR: CPT | Performed by: OBSTETRICS & GYNECOLOGY

## 2025-08-08 PROCEDURE — 3E033VJ INTRODUCTION OF OTHER HORMONE INTO PERIPHERAL VEIN, PERCUTANEOUS APPROACH: ICD-10-PCS | Performed by: OBSTETRICS & GYNECOLOGY

## 2025-08-08 PROCEDURE — 25010000002 ROPIVACAINE PER 1 MG: Performed by: NURSE ANESTHETIST, CERTIFIED REGISTERED

## 2025-08-08 PROCEDURE — 0U7C7DJ DILATION OF CERVIX WITH INTRALUM DEV, TEMP, VIA OPENING: ICD-10-PCS | Performed by: OBSTETRICS & GYNECOLOGY

## 2025-08-08 PROCEDURE — 86901 BLOOD TYPING SEROLOGIC RH(D): CPT | Performed by: OBSTETRICS & GYNECOLOGY

## 2025-08-08 PROCEDURE — S0260 H&P FOR SURGERY: HCPCS | Performed by: OBSTETRICS & GYNECOLOGY

## 2025-08-08 PROCEDURE — 85027 COMPLETE CBC AUTOMATED: CPT | Performed by: OBSTETRICS & GYNECOLOGY

## 2025-08-08 PROCEDURE — 80053 COMPREHEN METABOLIC PANEL: CPT | Performed by: OBSTETRICS & GYNECOLOGY

## 2025-08-08 RX ORDER — ROPIVACAINE HYDROCHLORIDE 2 MG/ML
INJECTION, SOLUTION EPIDURAL; INFILTRATION; PERINEURAL
Status: COMPLETED | OUTPATIENT
Start: 2025-08-08 | End: 2025-08-09

## 2025-08-08 RX ORDER — OXYTOCIN/0.9 % SODIUM CHLORIDE 30/500 ML
1 PLASTIC BAG, INJECTION (ML) INTRAVENOUS
Status: DISCONTINUED | OUTPATIENT
Start: 2025-08-08 | End: 2025-08-09

## 2025-08-08 RX ORDER — MAGNESIUM CARB/ALUMINUM HYDROX 105-160MG
30 TABLET,CHEWABLE ORAL ONCE
Status: DISCONTINUED | OUTPATIENT
Start: 2025-08-08 | End: 2025-08-09 | Stop reason: HOSPADM

## 2025-08-08 RX ORDER — ONDANSETRON 2 MG/ML
4 INJECTION INTRAMUSCULAR; INTRAVENOUS EVERY 6 HOURS PRN
Status: DISCONTINUED | OUTPATIENT
Start: 2025-08-08 | End: 2025-08-09

## 2025-08-08 RX ORDER — ONDANSETRON 4 MG/1
4 TABLET, ORALLY DISINTEGRATING ORAL EVERY 8 HOURS PRN
Qty: 15 TABLET | Refills: 0 | Status: SHIPPED | OUTPATIENT
Start: 2025-08-08

## 2025-08-08 RX ORDER — LIDOCAINE HYDROCHLORIDE 10 MG/ML
0.5 INJECTION, SOLUTION EPIDURAL; INFILTRATION; INTRACAUDAL; PERINEURAL ONCE AS NEEDED
Status: DISCONTINUED | OUTPATIENT
Start: 2025-08-08 | End: 2025-08-09 | Stop reason: HOSPADM

## 2025-08-08 RX ORDER — ONDANSETRON 4 MG/1
4 TABLET, ORALLY DISINTEGRATING ORAL EVERY 6 HOURS PRN
Status: DISCONTINUED | OUTPATIENT
Start: 2025-08-08 | End: 2025-08-09

## 2025-08-08 RX ORDER — SODIUM CHLORIDE 0.9 % (FLUSH) 0.9 %
10 SYRINGE (ML) INJECTION EVERY 12 HOURS SCHEDULED
Status: DISCONTINUED | OUTPATIENT
Start: 2025-08-08 | End: 2025-08-09 | Stop reason: HOSPADM

## 2025-08-08 RX ORDER — LIDOCAINE HYDROCHLORIDE AND EPINEPHRINE 15; 5 MG/ML; UG/ML
INJECTION, SOLUTION EPIDURAL
Status: COMPLETED | OUTPATIENT
Start: 2025-08-08 | End: 2025-08-08

## 2025-08-08 RX ORDER — BETAMETHASONE SODIUM PHOSPHATE AND BETAMETHASONE ACETATE 3; 3 MG/ML; MG/ML
12 INJECTION, SUSPENSION INTRA-ARTICULAR; INTRALESIONAL; INTRAMUSCULAR; SOFT TISSUE EVERY 24 HOURS
Status: DISCONTINUED | OUTPATIENT
Start: 2025-08-08 | End: 2025-08-09

## 2025-08-08 RX ORDER — SODIUM CHLORIDE, SODIUM LACTATE, POTASSIUM CHLORIDE, CALCIUM CHLORIDE 600; 310; 30; 20 MG/100ML; MG/100ML; MG/100ML; MG/100ML
125 INJECTION, SOLUTION INTRAVENOUS CONTINUOUS
Status: DISCONTINUED | OUTPATIENT
Start: 2025-08-08 | End: 2025-08-09

## 2025-08-08 RX ORDER — SODIUM CHLORIDE 9 MG/ML
40 INJECTION, SOLUTION INTRAVENOUS AS NEEDED
Status: DISCONTINUED | OUTPATIENT
Start: 2025-08-08 | End: 2025-08-09 | Stop reason: HOSPADM

## 2025-08-08 RX ORDER — ACETAMINOPHEN 325 MG/1
650 TABLET ORAL EVERY 4 HOURS PRN
Status: DISCONTINUED | OUTPATIENT
Start: 2025-08-08 | End: 2025-08-09 | Stop reason: HOSPADM

## 2025-08-08 RX ORDER — SODIUM CHLORIDE 0.9 % (FLUSH) 0.9 %
10 SYRINGE (ML) INJECTION AS NEEDED
Status: DISCONTINUED | OUTPATIENT
Start: 2025-08-08 | End: 2025-08-09 | Stop reason: HOSPADM

## 2025-08-08 RX ORDER — EPHEDRINE SULFATE 50 MG/ML
5 INJECTION, SOLUTION INTRAVENOUS
Status: DISCONTINUED | OUTPATIENT
Start: 2025-08-08 | End: 2025-08-09 | Stop reason: HOSPADM

## 2025-08-08 RX ORDER — TERBUTALINE SULFATE 1 MG/ML
0.25 INJECTION SUBCUTANEOUS AS NEEDED
Status: DISCONTINUED | OUTPATIENT
Start: 2025-08-08 | End: 2025-08-09 | Stop reason: HOSPADM

## 2025-08-08 RX ORDER — FENTANYL/ROPIVACAINE/NS/PF 2MCG/ML-.2
PLASTIC BAG, INJECTION (ML) INJECTION
Status: COMPLETED
Start: 2025-08-08 | End: 2025-08-08

## 2025-08-08 RX ORDER — BUTORPHANOL TARTRATE 2 MG/ML
1 INJECTION, SOLUTION INTRAMUSCULAR; INTRAVENOUS
Status: DISCONTINUED | OUTPATIENT
Start: 2025-08-08 | End: 2025-08-09 | Stop reason: HOSPADM

## 2025-08-08 RX ADMIN — BETAMETHASONE SODIUM PHOSPHATE AND BETAMETHASONE ACETATE 12 MG: 3; 3 INJECTION, SUSPENSION INTRA-ARTICULAR; INTRALESIONAL; INTRAMUSCULAR at 12:25

## 2025-08-08 RX ADMIN — Medication 10 ML/HR: at 17:29

## 2025-08-08 RX ADMIN — SODIUM CHLORIDE, POTASSIUM CHLORIDE, SODIUM LACTATE AND CALCIUM CHLORIDE 125 ML/HR: 600; 310; 30; 20 INJECTION, SOLUTION INTRAVENOUS at 16:00

## 2025-08-08 RX ADMIN — SODIUM CHLORIDE, POTASSIUM CHLORIDE, SODIUM LACTATE AND CALCIUM CHLORIDE 125 ML/HR: 600; 310; 30; 20 INJECTION, SOLUTION INTRAVENOUS at 20:24

## 2025-08-08 RX ADMIN — LIDOCAINE HYDROCHLORIDE AND EPINEPHRINE 3 ML: 15; 5 INJECTION, SOLUTION EPIDURAL at 17:29

## 2025-08-08 RX ADMIN — LIDOCAINE HYDROCHLORIDE AND EPINEPHRINE 2 ML: 15; 5 INJECTION, SOLUTION EPIDURAL at 17:22

## 2025-08-08 RX ADMIN — SODIUM CHLORIDE, POTASSIUM CHLORIDE, SODIUM LACTATE AND CALCIUM CHLORIDE 125 ML/HR: 600; 310; 30; 20 INJECTION, SOLUTION INTRAVENOUS at 14:10

## 2025-08-08 RX ADMIN — ONDANSETRON 4 MG: 2 INJECTION INTRAMUSCULAR; INTRAVENOUS at 15:30

## 2025-08-08 RX ADMIN — ROPIVACAINE HYDROCHLORIDE 5 ML: 2 INJECTION, SOLUTION EPIDURAL; INFILTRATION; PERINEURAL at 17:29

## 2025-08-08 RX ADMIN — SODIUM CHLORIDE 2 MILLI-UNITS/MIN: 9 INJECTION, SOLUTION INTRAVENOUS at 14:43

## 2025-08-09 LAB
ATMOSPHERIC PRESS: 744.4 MMHG
ATMOSPHERIC PRESS: 745.6 MMHG
BASE EXCESS BLDCOA CALC-SCNC: -4.2 MMOL/L (ref -2–2)
BASE EXCESS BLDCOV CALC-SCNC: -3.3 MMOL/L (ref -2–2)
HCO3 BLDCOA-SCNC: 24.9 MMOL/L
HCO3 BLDCOV-SCNC: 22.5 MMOL/L
PCO2 BLDCOA: 61.4 MMHG (ref 33–49)
PCO2 BLDCOV: 42.1 MM HG (ref 35–51.3)
PH BLDCOA: 7.22 PH UNITS (ref 7.18–7.34)
PH BLDCOV: 7.34 PH UNITS (ref 7.26–7.4)
PO2 BLDCOA: 14.5 MMHG
PO2 BLDCOV: 23 MM HG (ref 19–39)
SAO2 % BLDCOA: 12.8 %
SAO2 % BLDCOV: 36.2 %

## 2025-08-09 PROCEDURE — 59025 FETAL NON-STRESS TEST: CPT

## 2025-08-09 PROCEDURE — 3E0P7VZ INTRODUCTION OF HORMONE INTO FEMALE REPRODUCTIVE, VIA NATURAL OR ARTIFICIAL OPENING: ICD-10-PCS | Performed by: OBSTETRICS & GYNECOLOGY

## 2025-08-09 PROCEDURE — 25010000002 FENTANYL CITRATE (PF) 50 MCG/ML SOLUTION: Performed by: NURSE ANESTHETIST, CERTIFIED REGISTERED

## 2025-08-09 PROCEDURE — 25810000003 LACTATED RINGERS PER 1000 ML: Performed by: OBSTETRICS & GYNECOLOGY

## 2025-08-09 PROCEDURE — 25010000002 PROCHLORPERAZINE 10 MG/2ML SOLUTION: Performed by: NURSE ANESTHETIST, CERTIFIED REGISTERED

## 2025-08-09 PROCEDURE — 25010000002 OXYTOCIN PER 10 UNITS: Performed by: OBSTETRICS & GYNECOLOGY

## 2025-08-09 PROCEDURE — 25010000002 ONDANSETRON PER 1 MG: Performed by: OBSTETRICS & GYNECOLOGY

## 2025-08-09 PROCEDURE — 51702 INSERT TEMP BLADDER CATH: CPT

## 2025-08-09 PROCEDURE — 25010000002 METHYLERGONOVINE MALEATE PER 0.2 MG

## 2025-08-09 PROCEDURE — 25010000002 LIDOCAINE-EPINEPHRINE (PF) 2 %-1:200000 SOLUTION: Performed by: NURSE ANESTHETIST, CERTIFIED REGISTERED

## 2025-08-09 PROCEDURE — 82803 BLOOD GASES ANY COMBINATION: CPT | Performed by: OBSTETRICS & GYNECOLOGY

## 2025-08-09 PROCEDURE — 59400 OBSTETRICAL CARE: CPT | Performed by: OBSTETRICS & GYNECOLOGY

## 2025-08-09 PROCEDURE — 3E033GC INTRODUCTION OF OTHER THERAPEUTIC SUBSTANCE INTO PERIPHERAL VEIN, PERCUTANEOUS APPROACH: ICD-10-PCS | Performed by: OBSTETRICS & GYNECOLOGY

## 2025-08-09 PROCEDURE — 25810000003 SODIUM CHLORIDE 0.9 % SOLUTION: Performed by: OBSTETRICS & GYNECOLOGY

## 2025-08-09 PROCEDURE — 25010000002 ROPIVACAINE PER 1 MG: Performed by: NURSE ANESTHETIST, CERTIFIED REGISTERED

## 2025-08-09 RX ORDER — DOCUSATE SODIUM 100 MG/1
100 CAPSULE, LIQUID FILLED ORAL 2 TIMES DAILY
Status: DISCONTINUED | OUTPATIENT
Start: 2025-08-09 | End: 2025-08-11 | Stop reason: HOSPADM

## 2025-08-09 RX ORDER — OXYTOCIN/0.9 % SODIUM CHLORIDE 30/500 ML
250 PLASTIC BAG, INJECTION (ML) INTRAVENOUS CONTINUOUS
Status: ACTIVE | OUTPATIENT
Start: 2025-08-09 | End: 2025-08-09

## 2025-08-09 RX ORDER — HYDROCODONE BITARTRATE AND ACETAMINOPHEN 5; 325 MG/1; MG/1
1 TABLET ORAL EVERY 4 HOURS PRN
Status: DISCONTINUED | OUTPATIENT
Start: 2025-08-09 | End: 2025-08-11 | Stop reason: HOSPADM

## 2025-08-09 RX ORDER — FENTANYL CITRATE 50 UG/ML
INJECTION, SOLUTION INTRAMUSCULAR; INTRAVENOUS
Status: COMPLETED
Start: 2025-08-09 | End: 2025-08-09

## 2025-08-09 RX ORDER — ACETAMINOPHEN 325 MG/1
650 TABLET ORAL EVERY 6 HOURS
Status: DISCONTINUED | OUTPATIENT
Start: 2025-08-09 | End: 2025-08-09 | Stop reason: HOSPADM

## 2025-08-09 RX ORDER — CALCIUM CARBONATE 500 MG/1
2 TABLET, CHEWABLE ORAL 3 TIMES DAILY PRN
Status: DISCONTINUED | OUTPATIENT
Start: 2025-08-09 | End: 2025-08-11 | Stop reason: HOSPADM

## 2025-08-09 RX ORDER — TRANEXAMIC ACID 10 MG/ML
INJECTION, SOLUTION INTRAVENOUS
Status: COMPLETED
Start: 2025-08-09 | End: 2025-08-09

## 2025-08-09 RX ORDER — METHYLERGONOVINE MALEATE 0.2 MG/ML
INJECTION INTRAVENOUS
Status: COMPLETED
Start: 2025-08-09 | End: 2025-08-09

## 2025-08-09 RX ORDER — PROCHLORPERAZINE EDISYLATE 5 MG/ML
10 INJECTION INTRAMUSCULAR; INTRAVENOUS EVERY 6 HOURS PRN
Status: DISCONTINUED | OUTPATIENT
Start: 2025-08-09 | End: 2025-08-09

## 2025-08-09 RX ORDER — ONDANSETRON 2 MG/ML
4 INJECTION INTRAMUSCULAR; INTRAVENOUS EVERY 6 HOURS PRN
Status: DISCONTINUED | OUTPATIENT
Start: 2025-08-09 | End: 2025-08-09 | Stop reason: HOSPADM

## 2025-08-09 RX ORDER — HYDROCODONE BITARTRATE AND ACETAMINOPHEN 10; 325 MG/1; MG/1
1 TABLET ORAL EVERY 4 HOURS PRN
Status: DISCONTINUED | OUTPATIENT
Start: 2025-08-09 | End: 2025-08-11 | Stop reason: HOSPADM

## 2025-08-09 RX ORDER — ROPIVACAINE HYDROCHLORIDE 2 MG/ML
INJECTION, SOLUTION EPIDURAL; INFILTRATION; PERINEURAL
Status: COMPLETED
Start: 2025-08-09 | End: 2025-08-09

## 2025-08-09 RX ORDER — OXYTOCIN/0.9 % SODIUM CHLORIDE 30/500 ML
125 PLASTIC BAG, INJECTION (ML) INTRAVENOUS ONCE AS NEEDED
Status: DISCONTINUED | OUTPATIENT
Start: 2025-08-09 | End: 2025-08-11 | Stop reason: HOSPADM

## 2025-08-09 RX ORDER — ONDANSETRON 4 MG/1
4 TABLET, ORALLY DISINTEGRATING ORAL EVERY 6 HOURS PRN
Status: DISCONTINUED | OUTPATIENT
Start: 2025-08-09 | End: 2025-08-11 | Stop reason: HOSPADM

## 2025-08-09 RX ORDER — MISOPROSTOL 200 UG/1
TABLET ORAL
Status: COMPLETED
Start: 2025-08-09 | End: 2025-08-09

## 2025-08-09 RX ORDER — DIPHENOXYLATE HYDROCHLORIDE AND ATROPINE SULFATE 2.5; .025 MG/1; MG/1
1 TABLET ORAL
Status: DISCONTINUED | OUTPATIENT
Start: 2025-08-09 | End: 2025-08-09

## 2025-08-09 RX ORDER — ONDANSETRON 4 MG/1
4 TABLET, ORALLY DISINTEGRATING ORAL EVERY 6 HOURS PRN
Status: DISCONTINUED | OUTPATIENT
Start: 2025-08-09 | End: 2025-08-09 | Stop reason: HOSPADM

## 2025-08-09 RX ORDER — OXYTOCIN/0.9 % SODIUM CHLORIDE 30/500 ML
999 PLASTIC BAG, INJECTION (ML) INTRAVENOUS ONCE
Status: COMPLETED | OUTPATIENT
Start: 2025-08-09 | End: 2025-08-09

## 2025-08-09 RX ORDER — ACETAMINOPHEN 325 MG/1
650 TABLET ORAL EVERY 6 HOURS PRN
Status: DISCONTINUED | OUTPATIENT
Start: 2025-08-09 | End: 2025-08-11 | Stop reason: HOSPADM

## 2025-08-09 RX ORDER — CARBOPROST TROMETHAMINE 250 UG/ML
INJECTION, SOLUTION INTRAMUSCULAR
Status: COMPLETED
Start: 2025-08-09 | End: 2025-08-09

## 2025-08-09 RX ORDER — FENTANYL CITRATE 50 UG/ML
INJECTION, SOLUTION INTRAMUSCULAR; INTRAVENOUS AS NEEDED
Status: DISCONTINUED | OUTPATIENT
Start: 2025-08-09 | End: 2025-08-09 | Stop reason: SURG

## 2025-08-09 RX ORDER — ONDANSETRON 2 MG/ML
4 INJECTION INTRAMUSCULAR; INTRAVENOUS EVERY 6 HOURS PRN
Status: DISCONTINUED | OUTPATIENT
Start: 2025-08-09 | End: 2025-08-11 | Stop reason: HOSPADM

## 2025-08-09 RX ORDER — IBUPROFEN 600 MG/1
600 TABLET, FILM COATED ORAL EVERY 6 HOURS
Status: DISCONTINUED | OUTPATIENT
Start: 2025-08-09 | End: 2025-08-09

## 2025-08-09 RX ORDER — FAMOTIDINE 10 MG/ML
20 INJECTION, SOLUTION INTRAVENOUS ONCE AS NEEDED
Status: DISCONTINUED | OUTPATIENT
Start: 2025-08-09 | End: 2025-08-09 | Stop reason: HOSPADM

## 2025-08-09 RX ORDER — MISOPROSTOL 200 UG/1
600 TABLET ORAL ONCE
Status: DISCONTINUED | OUTPATIENT
Start: 2025-08-09 | End: 2025-08-09 | Stop reason: HOSPADM

## 2025-08-09 RX ORDER — LIDOCAINE HYDROCHLORIDE AND EPINEPHRINE 20; 5 MG/ML; UG/ML
INJECTION, SOLUTION EPIDURAL; INFILTRATION; INTRACAUDAL; PERINEURAL AS NEEDED
Status: DISCONTINUED | OUTPATIENT
Start: 2025-08-09 | End: 2025-08-09 | Stop reason: SURG

## 2025-08-09 RX ORDER — IBUPROFEN 600 MG/1
600 TABLET, FILM COATED ORAL EVERY 6 HOURS PRN
Status: DISCONTINUED | OUTPATIENT
Start: 2025-08-09 | End: 2025-08-11 | Stop reason: HOSPADM

## 2025-08-09 RX ORDER — SODIUM CHLORIDE 0.9 % (FLUSH) 0.9 %
1-10 SYRINGE (ML) INJECTION AS NEEDED
Status: DISCONTINUED | OUTPATIENT
Start: 2025-08-09 | End: 2025-08-11 | Stop reason: HOSPADM

## 2025-08-09 RX ORDER — FAMOTIDINE 20 MG/1
20 TABLET, FILM COATED ORAL ONCE AS NEEDED
Status: DISCONTINUED | OUTPATIENT
Start: 2025-08-09 | End: 2025-08-09 | Stop reason: HOSPADM

## 2025-08-09 RX ORDER — IBUPROFEN 600 MG/1
600 TABLET, FILM COATED ORAL EVERY 6 HOURS
Status: DISCONTINUED | OUTPATIENT
Start: 2025-08-09 | End: 2025-08-09 | Stop reason: HOSPADM

## 2025-08-09 RX ADMIN — SODIUM CHLORIDE 250 ML/HR: 9 INJECTION, SOLUTION INTRAVENOUS at 07:22

## 2025-08-09 RX ADMIN — WITCH HAZEL: 500 SOLUTION RECTAL; TOPICAL at 11:32

## 2025-08-09 RX ADMIN — IBUPROFEN 600 MG: 600 TABLET, FILM COATED ORAL at 14:41

## 2025-08-09 RX ADMIN — ACETAMINOPHEN 650 MG: 325 TABLET ORAL at 07:46

## 2025-08-09 RX ADMIN — ROPIVACAINE HYDROCHLORIDE 3 ML: 2 INJECTION, SOLUTION EPIDURAL; INFILTRATION; PERINEURAL at 04:39

## 2025-08-09 RX ADMIN — FENTANYL CITRATE 100 MCG: 50 INJECTION, SOLUTION INTRAMUSCULAR; INTRAVENOUS at 04:39

## 2025-08-09 RX ADMIN — ACETAMINOPHEN 650 MG: 325 TABLET ORAL at 23:05

## 2025-08-09 RX ADMIN — LIDOCAINE HYDROCHLORIDE,EPINEPHRINE BITARTRATE 2 ML: 20; .005 INJECTION, SOLUTION EPIDURAL; INFILTRATION; INTRACAUDAL; PERINEURAL at 04:39

## 2025-08-09 RX ADMIN — IBUPROFEN 600 MG: 600 TABLET, FILM COATED ORAL at 20:46

## 2025-08-09 RX ADMIN — ONDANSETRON 4 MG: 2 INJECTION INTRAMUSCULAR; INTRAVENOUS at 02:05

## 2025-08-09 RX ADMIN — METHYLERGONOVINE MALEATE 200 MCG: 0.2 INJECTION, SOLUTION INTRAMUSCULAR; INTRAVENOUS at 07:26

## 2025-08-09 RX ADMIN — SODIUM CHLORIDE, POTASSIUM CHLORIDE, SODIUM LACTATE AND CALCIUM CHLORIDE 125 ML/HR: 600; 310; 30; 20 INJECTION, SOLUTION INTRAVENOUS at 03:18

## 2025-08-09 RX ADMIN — DIPHENOXYLATE HYDROCHLORIDE AND ATROPINE SULFATE 1 TABLET: 2.5; .025 TABLET ORAL at 07:46

## 2025-08-09 RX ADMIN — DOCUSATE SODIUM 100 MG: 100 CAPSULE, LIQUID FILLED ORAL at 20:46

## 2025-08-09 RX ADMIN — IBUPROFEN 600 MG: 600 TABLET, FILM COATED ORAL at 09:06

## 2025-08-09 RX ADMIN — CARBOPROST TROMETHAMINE 250 MCG: 250 INJECTION, SOLUTION INTRAMUSCULAR at 07:21

## 2025-08-09 RX ADMIN — PROCHLORPERAZINE EDISYLATE 10 MG: 5 INJECTION INTRAMUSCULAR; INTRAVENOUS at 04:21

## 2025-08-09 RX ADMIN — Medication: at 11:32

## 2025-08-09 RX ADMIN — ACETAMINOPHEN 650 MG: 325 TABLET ORAL at 16:10

## 2025-08-09 RX ADMIN — TRANEXAMIC ACID 1000 MG: 10 INJECTION, SOLUTION INTRAVENOUS at 07:19

## 2025-08-09 RX ADMIN — MISOPROSTOL 800 MCG: 200 TABLET ORAL at 07:05

## 2025-08-09 RX ADMIN — Medication: at 12:54

## 2025-08-09 RX ADMIN — Medication 10 ML/HR: at 02:01

## 2025-08-09 RX ADMIN — SODIUM CHLORIDE 999 ML/HR: 9 INJECTION, SOLUTION INTRAVENOUS at 07:06

## 2025-08-09 RX ADMIN — ONDANSETRON 4 MG: 2 INJECTION INTRAMUSCULAR; INTRAVENOUS at 08:05

## 2025-08-10 RX ADMIN — WITCH HAZEL: 500 SOLUTION RECTAL; TOPICAL at 07:43

## 2025-08-10 RX ADMIN — IBUPROFEN 600 MG: 600 TABLET, FILM COATED ORAL at 23:45

## 2025-08-10 RX ADMIN — IBUPROFEN 600 MG: 600 TABLET, FILM COATED ORAL at 13:59

## 2025-08-10 RX ADMIN — DOCUSATE SODIUM 100 MG: 100 CAPSULE, LIQUID FILLED ORAL at 08:07

## 2025-08-10 RX ADMIN — ACETAMINOPHEN 650 MG: 325 TABLET ORAL at 16:11

## 2025-08-10 RX ADMIN — IBUPROFEN 600 MG: 600 TABLET, FILM COATED ORAL at 07:43

## 2025-08-10 RX ADMIN — DOCUSATE SODIUM 100 MG: 100 CAPSULE, LIQUID FILLED ORAL at 20:42

## 2025-08-10 RX ADMIN — ACETAMINOPHEN 650 MG: 325 TABLET ORAL at 09:17

## 2025-08-11 VITALS
WEIGHT: 207 LBS | TEMPERATURE: 98.3 F | HEART RATE: 108 BPM | RESPIRATION RATE: 16 BRPM | OXYGEN SATURATION: 99 % | BODY MASS INDEX: 34.49 KG/M2 | HEIGHT: 65 IN | SYSTOLIC BLOOD PRESSURE: 125 MMHG | DIASTOLIC BLOOD PRESSURE: 80 MMHG

## 2025-08-11 RX ORDER — IBUPROFEN 600 MG/1
600 TABLET, FILM COATED ORAL EVERY 6 HOURS PRN
Qty: 15 TABLET | Refills: 0 | Status: SHIPPED | OUTPATIENT
Start: 2025-08-11

## 2025-08-11 RX ORDER — ACETAMINOPHEN 325 MG/1
650 TABLET ORAL EVERY 6 HOURS PRN
Qty: 15 TABLET | Refills: 0 | Status: SHIPPED | OUTPATIENT
Start: 2025-08-11

## 2025-08-11 RX ADMIN — DOCUSATE SODIUM 100 MG: 100 CAPSULE, LIQUID FILLED ORAL at 08:20

## 2025-08-11 RX ADMIN — ACETAMINOPHEN 650 MG: 325 TABLET ORAL at 03:32

## 2025-08-11 RX ADMIN — IBUPROFEN 600 MG: 600 TABLET, FILM COATED ORAL at 08:20

## 2025-08-11 RX ADMIN — ACETAMINOPHEN 650 MG: 325 TABLET ORAL at 11:30

## 2025-08-12 ENCOUNTER — MATERNAL SCREENING (OUTPATIENT)
Dept: CALL CENTER | Facility: HOSPITAL | Age: 25
End: 2025-08-12
Payer: COMMERCIAL

## 2025-08-16 ENCOUNTER — HOSPITAL ENCOUNTER (INPATIENT)
Facility: HOSPITAL | Age: 25
LOS: 2 days | Discharge: HOME | DRG: 776 | End: 2025-08-18
Attending: OBSTETRICS & GYNECOLOGY | Admitting: OBSTETRICS & GYNECOLOGY
Payer: COMMERCIAL

## 2025-08-19 ENCOUNTER — TELEPHONE (OUTPATIENT)
Dept: LACTATION | Facility: HOSPITAL | Age: 25
End: 2025-08-19
Payer: COMMERCIAL

## 2025-08-20 ENCOUNTER — MATERNAL SCREENING (OUTPATIENT)
Dept: CALL CENTER | Facility: HOSPITAL | Age: 25
End: 2025-08-20
Payer: COMMERCIAL

## 2025-08-22 ENCOUNTER — POSTPARTUM VISIT (OUTPATIENT)
Age: 25
End: 2025-08-22
Payer: COMMERCIAL

## 2025-08-22 VITALS — BODY MASS INDEX: 32.78 KG/M2 | DIASTOLIC BLOOD PRESSURE: 65 MMHG | SYSTOLIC BLOOD PRESSURE: 108 MMHG | HEIGHT: 65 IN

## 2025-08-22 DIAGNOSIS — Z78.9 BREASTFEEDING (INFANT): ICD-10-CM

## 2025-08-22 DIAGNOSIS — Z98.890 HISTORY OF EPISIOTOMY: ICD-10-CM
